# Patient Record
Sex: MALE
[De-identification: names, ages, dates, MRNs, and addresses within clinical notes are randomized per-mention and may not be internally consistent; named-entity substitution may affect disease eponyms.]

---

## 2024-02-12 PROBLEM — Z00.00 ENCOUNTER FOR PREVENTIVE HEALTH EXAMINATION: Status: ACTIVE | Noted: 2024-02-12

## 2024-02-21 ENCOUNTER — APPOINTMENT (OUTPATIENT)
Dept: NEUROLOGY | Facility: CLINIC | Age: 39
End: 2024-02-21
Payer: COMMERCIAL

## 2024-02-21 VITALS
WEIGHT: 112.44 LBS | SYSTOLIC BLOOD PRESSURE: 145 MMHG | HEIGHT: 63 IN | BODY MASS INDEX: 19.92 KG/M2 | DIASTOLIC BLOOD PRESSURE: 88 MMHG | HEART RATE: 76 BPM

## 2024-02-21 PROCEDURE — 99204 OFFICE O/P NEW MOD 45 MIN: CPT

## 2024-02-29 ENCOUNTER — APPOINTMENT (OUTPATIENT)
Dept: NEUROLOGY | Facility: CLINIC | Age: 39
End: 2024-02-29
Payer: COMMERCIAL

## 2024-02-29 PROCEDURE — 95886 MUSC TEST DONE W/N TEST COMP: CPT

## 2024-02-29 PROCEDURE — 95912 NRV CNDJ TEST 11-12 STUDIES: CPT

## 2024-02-29 NOTE — PHYSICAL EXAM

## 2024-02-29 NOTE — ASSESSMENT
[FreeTextEntry1] : Thoracic / Lumbar Radiculopathy.   - MRI f the Thoracic/lumbar spine without Richardson.  - EMG of the lower extremities.  - He will follow up with pain management. -Trial gabapentin, patient warned of the potential side effect of medication and he reported understanding   I, Scarlet Roper, Attest that this documentation has been prepared under the direction and in the presence of Provider Wade Holguin DO  Thank You for letting me assist in the management of this patient.   Wade Holguin DO Board Certified, Neurology

## 2024-02-29 NOTE — HISTORY OF PRESENT ILLNESS
[FreeTextEntry1] : It is a pleasure to see MRS. GREGORY In the office today. He is A 38-Year-old Man who presents to the office today for the evaluation of Mid back pain that progressed for 3 months. This came about on its own. The pain shoots across the mid back. After a trial of physical therapy for the back, there was no sufficient relief. He is open to follow up with our pain management doctors at this time. Additionally, he adds of having low back pain for six months that radiates to the left lower extremities. He trialed a round of physical therapy with no relief. There is pain that started on the left shoulder blade as well causing joint pain. 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 He denies pain in any other joints, muscle ligaments.

## 2024-03-09 ENCOUNTER — APPOINTMENT (OUTPATIENT)
Dept: MRI IMAGING | Facility: CLINIC | Age: 39
End: 2024-03-09
Payer: COMMERCIAL

## 2024-03-09 PROCEDURE — 72146 MRI CHEST SPINE W/O DYE: CPT

## 2024-03-09 PROCEDURE — 72148 MRI LUMBAR SPINE W/O DYE: CPT

## 2024-03-14 ENCOUNTER — APPOINTMENT (OUTPATIENT)
Dept: PAIN MANAGEMENT | Facility: CLINIC | Age: 39
End: 2024-03-14

## 2024-04-05 ENCOUNTER — APPOINTMENT (OUTPATIENT)
Dept: PAIN MANAGEMENT | Facility: CLINIC | Age: 39
End: 2024-04-05
Payer: COMMERCIAL

## 2024-04-05 PROCEDURE — 99204 OFFICE O/P NEW MOD 45 MIN: CPT

## 2024-04-05 NOTE — HISTORY OF PRESENT ILLNESS
[FreeTextEntry1] : This is a 38-year-old male here to establish care for a lower back pain with radicular features into the left lower extremity. He complains of a numbing sensation in the lower back. The pain came about insidiously about 8 months ago. He denies any trauma or injury to the area. He is referred to me by Dr. Holguin for further evaluation. He has trialed one session of physical therapy which provided no relief of his symptoms. MRI of the lumbar spine shows evidence of bulging discs at the L5-S1 and L4-5 levels. The option to trial injection therapy was discussed and he is agreeable.

## 2024-04-05 NOTE — ASSESSMENT
[FreeTextEntry1] : This is a 38-year-old male with complaints of lower back pain with radicular features into the left lower extremity.  He also complains of a numbing sensation in her lower back. The pain has not responded to conservative care, including medications, stretching, as well as active modalities, such as physical therapy.  Imaging studies as well as physical exam findings corroborate the symptomatology. We will proceed with a L5-S1 interlaminar ROSLYN x1 w/ mac and he will follow up afterwards. All this patients questions were answered and the conversation was understood well.  Patient had a MRI that shows a radicular component along with pain referred into the lower extremity. Patient has trialed rehab (Home exercise, physical therapy or chiropractic care) and medications I will schedule an L5-S1 interlaminar epidural steroid injection.  The patient has severe anxiety of procedures that necessitates monitored anesthesia care (MAC). The procedure performed will be close to major nerves, arteries, and spinal cord and/or joint structures. Due to the proximity of these structures, we need the patient to be still during the procedure. With the help of MAC, this will be safely achieved and decrease the risk of any complications.  Risk, benefits, pros and cons of procedure were explained to the patient using models and diagrams and their questions were answered.  I, Joann Khan, attest that this documentation has been prepared under the direction and in the presence of Provider Trinity Acosta MD.   Thank you for allowing me to assist in the management of this patient.    Best Regards,    Trinity Acosta M.D., FAAPMR   Diplomate, American Board of Physical Medicine and Rehabilitation Diplomate, American Board of Pain Medicine

## 2024-04-05 NOTE — PHYSICAL EXAM
[Normal Coordination] : normal coordination [Normal DTR UE/LE] : normal DTR UE/LE  [Normal Sensation] : normal sensation [Normal Mood and Affect] : normal mood and affect [Oriented] : oriented [Able to Communicate] : able to communicate [Well Developed] : well developed [Well Nourished] : well nourished [Extension] : extension [] : no swelling

## 2024-04-10 ENCOUNTER — APPOINTMENT (OUTPATIENT)
Dept: NEUROLOGY | Facility: CLINIC | Age: 39
End: 2024-04-10
Payer: COMMERCIAL

## 2024-04-10 PROCEDURE — 99214 OFFICE O/P EST MOD 30 MIN: CPT

## 2024-04-10 NOTE — PHYSICAL EXAM
[Person] : oriented to person [Place] : oriented to place [Time] : oriented to time [Concentration Intact] : normal concentrating ability [Visual Intact] : visual attention was ~T not ~L decreased [Naming Objects] : no difficulty naming common objects [Repeating Phrases] : no difficulty repeating a phrase [Writing A Sentence] : no difficulty writing a sentence [Fluency] : fluency intact [Comprehension] : comprehension intact [Reading] : reading intact [Past History] : adequate knowledge of personal past history [Cranial Nerves Optic (II)] : visual acuity intact bilaterally,  visual fields full to confrontation, pupils equal round and reactive to light [Cranial Nerves Oculomotor (III)] : extraocular motion intact [Cranial Nerves Trigeminal (V)] : facial sensation intact symmetrically [Cranial Nerves Facial (VII)] : face symmetrical [Cranial Nerves Vestibulocochlear (VIII)] : hearing was intact bilaterally [Cranial Nerves Glossopharyngeal (IX)] : tongue and palate midline [Cranial Nerves Accessory (XI - Cranial And Spinal)] : head turning and shoulder shrug symmetric [Cranial Nerves Hypoglossal (XII)] : there was no tongue deviation with protrusion [Motor Tone] : muscle tone was normal in all four extremities [Motor Strength] : muscle strength was normal in all four extremities [No Muscle Atrophy] : normal bulk in all four extremities [Sensation Tactile Decrease] : light touch was intact [Abnormal Walk] : normal gait [Balance] : balance was intact [2+] : Ankle jerk left 2+ [General Appearance - Alert] : alert [General Appearance - In No Acute Distress] : in no acute distress [Oriented To Time, Place, And Person] : oriented to person, place, and time [Impaired Insight] : insight and judgment were intact [Affect] : the affect was normal [Past-pointing] : there was no past-pointing [Tremor] : no tremor present [Plantar Reflex Right Only] : normal on the right [Plantar Reflex Left Only] : normal on the left [FreeTextEntry7] : Tenderness to light percussion in the mid back [FreeTextEntry1] : tenderness to palpation of lumbar paraspinals  [Involuntary Movements] : no involuntary movements were seen

## 2024-04-10 NOTE — HISTORY OF PRESENT ILLNESS
[FreeTextEntry1] : Original Presentation : It is a pleasure to see MRS. GREGORY In the office today. He is A 38-Year-old Man who presents to the office today for the evaluation of Mid back pain that progressed for 3 months. This came about on its own. The pain shoots across the mid back. After a trial of physical therapy for the back, there was no sufficient relief. He is open to follow up with our pain management doctors at this time. Additionally, he adds of having low back pain for six months that radiates to the left lower extremities. He trialed a round of physical therapy with no relief. There is pain that started on the left shoulder blade as well causing joint pain. 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 He denies pain in any other joints, muscle ligaments.   Diagnostic Testing :   MRI of the thoracic spine dated 3/09/24 is normal.  MRI of the lumbar spine dated 3/09/24 demonstrates small annular budges L5-S1 and L4-5.  EMG of the lower extremities dated 2/29/24 is normal.   Today : Today I had the pleasure of seeing  Mr. GREGORY in follow up. The patient's prior history and imaging have been reviewed.   Patient remains under our care for chronic lumbar radiculopathy. This is a chronic condition for which she is receiving active and continuous treatment. Today we reviewed the results of her MRI of the Thoracic and Lumbar Spine  as well as her EMG / NCV testing of the lower extremities. Patient saw pain management Dr. Acosta on 4.5.24 who also reviewed her diagnostic testing and offered an L5-S1  interlaminar epidural steroid injection on 4.16.24. Patient trialed gabapentin 300mg TID but states it did not provide any relief. Today he notes numbness and tingling to his left glute and posterior thigh and well as "tightness" along his lumbar paraspinals. He denies worsening or progressive symptoms as well as any saddle anesthesia or urinary / bowel incontience or retention.

## 2024-04-10 NOTE — ASSESSMENT
[FreeTextEntry1] : 38 year old male with low back pain and left sided Lumbar Radiculopathy. Patient will begin cyclobenzaprine 10mg BID PRN while he awaits his injection with pain  management on 4.16.24 and will follow up with neurology as needed.  Patient is aware that they may call/ contact the office at any time if they have any additional questions or concerns regarding their management. All potential risks, benefits, side effects and interactions of any medications prescribed where discussed in detail with patient at the time of todays encounter.*  Supervising Physician : Wade Holguin DO

## 2024-04-23 ENCOUNTER — APPOINTMENT (OUTPATIENT)
Dept: PAIN MANAGEMENT | Facility: CLINIC | Age: 39
End: 2024-04-23

## 2024-04-23 ENCOUNTER — APPOINTMENT (OUTPATIENT)
Dept: PAIN MANAGEMENT | Facility: CLINIC | Age: 39
End: 2024-04-23
Payer: COMMERCIAL

## 2024-04-23 PROCEDURE — 94761 N-INVAS EAR/PLS OXIMETRY MLT: CPT

## 2024-04-23 PROCEDURE — 62323 NJX INTERLAMINAR LMBR/SAC: CPT

## 2024-04-23 PROCEDURE — 93770 DETERMINATION VENOUS PRESS: CPT | Mod: 59

## 2024-04-23 PROCEDURE — 93040 RHYTHM ECG WITH REPORT: CPT | Mod: 79

## 2024-04-23 NOTE — PROCEDURE
[FreeTextEntry3] :  Interlaminar Lumbar Epidural Steroid Injection     Date:  2024  Patient: BRI GREGORY   :  1985   Preoperative Diagnosis: Lumbar Radiculopathy  Postoperative Diagnosis: Lumbar Radiculopathy   Procedure:  1. Interlaminar L5-S1 Lumbar Epidural Injection under fluoroscopy 2. Epidurography 3. Fluoroscopic guidance and localization of needle    Physician: Trinity Acosta M.D.   Anesthesia: Local   Medical Necessity:  Failure of conservative management.   Consent:  Though unusual, the possible complications including infection, bleeding, nerve damage, hospital admission, stroke, pneumothorax, death or failure of the procedure are theoretically possible. The patient was educated about the of the procedure and alternative therapies. All questions were answered and the patient freely gave consent to proceed.  Indication for Fluoroscopy:  This procedure requires the precise placement of the spinal needle into the epidural space.  It is the only way to accurately and safely perform the injection.  PROCEDURE NOTE:  After obtaining written consent, in the sitting position an IV was placed in the upper extremity by the CRNA. The patient was placed in the prone position. The lumbosacral region was prepped with betadine and draped in usual sterile fashion. A time out was performed.  The L5-S1 interspace was identified using fluoroscopy. The skin was infiltrated with lidocaine 2% -- 2mL for subcutaneous analgesia. The epidural space was identified using a 18g touhy needle with a midline approach using a loss of resistance technique. 2mL omnipaque was used to define the space. A solution of 6ml of preservative-free sterile saline and 1ml of depomedrol 80mg was infused in 1mL increments slowly with minimal pressure on the syringe into the epidural space. The procedure was tolerated well. There was no evidence of CSF, paresthesias, or heme.     Epidurogram: Distal and proximal spread was noted.   Complications: none.    Disposition: I have examined the patient and there are no new physical findings since original presentation. The patient was discharged home with a . The discharge instruction sheet was given to the patient. Motor function was intact.   Comment: 1st Interlaminar ROSLYN today, depending on effectiveness would schedule 2nd Interlaminar in 1-2 weeks vs caudal epidural steroid vs follow up in office. Call if any problems  This document was signed by:   Trinity Acosta MD, FAAPMR Diplomate, American Board of Physical Medicine and Rehabilitation Diplomate, American Board of Pain Medicine

## 2024-05-07 ENCOUNTER — APPOINTMENT (OUTPATIENT)
Dept: PAIN MANAGEMENT | Facility: CLINIC | Age: 39
End: 2024-05-07
Payer: COMMERCIAL

## 2024-05-07 PROCEDURE — 99214 OFFICE O/P EST MOD 30 MIN: CPT

## 2024-05-07 NOTE — DATA REVIEWED
[FreeTextEntry1] : MRI of the thoracic spine dated 3/09/24 is normal.  MRI of the lumbar spine dated 3/09/24 demonstrates small annular budges L5-S1 and L4-5.  EMG of the lower extremities dated 2/29/24 is normal.

## 2024-05-07 NOTE — HISTORY OF PRESENT ILLNESS
[FreeTextEntry1] : This is a 38-year-old male here to establish care for a lower back pain with radicular features into the left lower extremity. He complains of a numbing sensation in the lower back. The pain came about insidiously about 8 months ago. He denies any trauma or injury to the area. He is referred to me by Dr. Holguin for further evaluation. He has trialed one session of physical therapy which provided no relief of his symptoms. MRI of the lumbar spine shows evidence of bulging discs at the L5-S1 and L4-5 levels. The option to trial injection therapy was discussed and he is agreeable.    TODAY: Last seen on 04/05/2024 and since then he underwent L5-S1 Interlaminar injection on 04/23/2024 and today reports at least 50% of pain relief and improvement of functioning. Some pain and numbness still remains. Patient rates his current pain level as 6/10. We will request second ROSLYN today in hope to build on the success of previous injection.

## 2024-05-07 NOTE — DISCUSSION/SUMMARY
[de-identified] : This is a 38-year-old male with complaints of lower back pain with radicular features into the left lower extremity.  He also complains of a numbing sensation in her lower back. The pain has not responded to conservative care, including medications, stretching, as well as active modalities, such as physical therapy.  Imaging studies as well as physical exam findings corroborate the symptomatology. He underwent L5-S1 Interlaminar injection on 04/23/2024 and today reports at least 50% of pain relief and improvement of functioning. Some pain and numbness still remains. We will proceed with a second L5-S1 interlaminar ROSLYN w/ MAC and he will follow up afterwards. All this patients questions were answered and the conversation was understood well.  Patient had a MRI that shows a radicular component along with pain referred into the lower extremity. Patient has trialed rehab (Home exercise, physical therapy or chiropractic care) and medications I will schedule an L5-S1 interlaminar epidural steroid injection.  The patient has severe anxiety of procedures that necessitates monitored anesthesia care (MAC). The procedure performed will be close to major nerves, arteries, and spinal cord and/or joint structures. Due to the proximity of these structures, we need the patient to be still during the procedure. With the help of MAC, this will be safely achieved and decrease the risk of any complications.  Risk, benefits, pros and cons of procedure were explained to the patient using models and diagrams and their questions were answered.  Total clinician time spent today on the patient is 30 minutes including preparing to see the patient, obtaining and/or reviewing and confirming history, performing medically necessary and appropriate examination, counseling and educating the patient and/or family, documenting clinical information in the EHR and communicating and/or referring to other healthcare professionals.  ZAHRA Watson M.D., FAAPMR

## 2024-05-07 NOTE — PHYSICAL EXAM
[Normal Coordination] : normal coordination [Normal DTR UE/LE] : normal DTR UE/LE  [Normal Sensation] : normal sensation [Normal Mood and Affect] : normal mood and affect [Oriented] : oriented [Able to Communicate] : able to communicate [Well Developed] : well developed [Well Nourished] : well nourished [Extension] : extension [] : patient ambulates without assistive device

## 2024-05-17 ENCOUNTER — APPOINTMENT (OUTPATIENT)
Dept: PAIN MANAGEMENT | Facility: CLINIC | Age: 39
End: 2024-05-17

## 2024-05-17 ENCOUNTER — APPOINTMENT (OUTPATIENT)
Dept: PAIN MANAGEMENT | Facility: CLINIC | Age: 39
End: 2024-05-17
Payer: COMMERCIAL

## 2024-05-17 PROCEDURE — 94761 N-INVAS EAR/PLS OXIMETRY MLT: CPT | Mod: 59

## 2024-05-17 PROCEDURE — 62323 NJX INTERLAMINAR LMBR/SAC: CPT

## 2024-05-17 PROCEDURE — 93770 DETERMINATION VENOUS PRESS: CPT | Mod: 59

## 2024-05-17 PROCEDURE — 93040 RHYTHM ECG WITH REPORT: CPT | Mod: 79

## 2024-05-17 NOTE — PROCEDURE
[FreeTextEntry3] :  Interlaminar Lumbar Epidural Steroid Injection     Date:  2024  Patient: BRI GREGORY   :  1985   Preoperative Diagnosis: Lumbar Radiculopathy  Postoperative Diagnosis: Lumbar Radiculopathy   Procedure:  1. Interlaminar L5-S1 Lumbar Epidural Injection under fluoroscopy 2. Epidurography 3. Fluoroscopic guidance and localization of needle    Physician: Trinity Acosta M.D.   Anesthesia: Local   Medical Necessity:  Failure of conservative management.   Consent:  Though unusual, the possible complications including infection, bleeding, nerve damage, hospital admission, stroke, pneumothorax, death or failure of the procedure are theoretically possible. The patient was educated about the of the procedure and alternative therapies. All questions were answered and the patient freely gave consent to proceed.  Indication for Fluoroscopy:  This procedure requires the precise placement of the spinal needle into the epidural space.  It is the only way to accurately and safely perform the injection.  PROCEDURE NOTE:  After obtaining written consent, in the sitting position an IV was placed in the upper extremity by the CRNA. The patient was placed in the prone position. The lumbosacral region was prepped with betadine and draped in usual sterile fashion. A time out was performed.  The L5-S1 interspace was identified using fluoroscopy. The skin was infiltrated with lidocaine 2% -- 2mL for subcutaneous analgesia. The epidural space was identified using a 18g touhy needle with a midline approach using a loss of resistance technique. 2mL omnipaque was used to define the space. A solution of 6ml of preservative-free sterile saline and 1ml of depomedrol 80mg was infused in 1mL increments slowly with minimal pressure on the syringe into the epidural space. The procedure was tolerated well. There was no evidence of CSF, paresthesias, or heme.     Epidurogram: Distal and proximal spread was noted.   Complications: none.    Disposition: I have examined the patient and there are no new physical findings since original presentation. The patient was discharged home with a . The discharge instruction sheet was given to the patient. Motor function was intact.   Comment:  2nd Interlaminar ROSLYN today, depending on effectiveness would schedule 2nd Interlaminar in 1-2 weeks vs caudal epidural steroid vs follow up in office. Call if any problems  This document was signed by:   Trinity Acosta MD, FAAPMR Diplomate, American Board of Physical Medicine and Rehabilitation Diplomate, American Board of Pain Medicine

## 2024-06-04 ENCOUNTER — APPOINTMENT (OUTPATIENT)
Dept: PAIN MANAGEMENT | Facility: CLINIC | Age: 39
End: 2024-06-04
Payer: COMMERCIAL

## 2024-06-04 DIAGNOSIS — M54.50 LOW BACK PAIN, UNSPECIFIED: ICD-10-CM

## 2024-06-04 DIAGNOSIS — M54.6 PAIN IN THORACIC SPINE: ICD-10-CM

## 2024-06-04 PROCEDURE — 99214 OFFICE O/P EST MOD 30 MIN: CPT

## 2024-06-04 RX ORDER — CYCLOBENZAPRINE HYDROCHLORIDE 10 MG/1
10 TABLET, FILM COATED ORAL 3 TIMES DAILY
Qty: 90 | Refills: 1 | Status: ACTIVE | COMMUNITY
Start: 2024-04-10 | End: 1900-01-01

## 2024-06-04 RX ORDER — GABAPENTIN 300 MG/1
300 CAPSULE ORAL 3 TIMES DAILY
Qty: 90 | Refills: 1 | Status: ACTIVE | COMMUNITY
Start: 2024-02-21 | End: 1900-01-01

## 2024-06-04 NOTE — DISCUSSION/SUMMARY
[Medication Risks Reviewed] : Medication risks reviewed [de-identified] : This is a 38-year-old male with complaints of lower back pain with radicular features into the left lower extremity.  He also complains of a numbing sensation in her lower back. The pain has not responded to conservative care, including medications, stretching, as well as active modalities, such as physical therapy.  Imaging studies as well as physical exam findings corroborate the symptomatology. He underwent second  L5-S1 Interlaminar injection on 05/17/2024 and today reports at least 50% of pain relief and improvement of functioning. Some pain and numbness still remains. We will proceed with a third L5-S1 interlaminar ROSLYN w/ MAC and he will follow up afterwards.  We will continue him on Gabapentin and Cyclobenzaprine. All this patients questions were answered and the conversation was understood well.  Patient had a MRI that shows a radicular component along with pain referred into the lower extremity. Patient has trialed rehab (Home exercise, physical therapy or chiropractic care) and medications I will schedule an L5-S1 interlaminar epidural steroid injection.  The patient has severe anxiety of procedures that necessitates monitored anesthesia care (MAC). The procedure performed will be close to major nerves, arteries, and spinal cord and/or joint structures. Due to the proximity of these structures, we need the patient to be still during the procedure. With the help of MAC, this will be safely achieved and decrease the risk of any complications.  Risk, benefits, pros and cons of procedure were explained to the patient using models and diagrams and their questions were answered.  Total clinician time spent today on the patient is 30 minutes including preparing to see the patient, obtaining and/or reviewing and confirming history, performing medically necessary and appropriate examination, counseling and educating the patient and/or family, documenting clinical information in the EHR and communicating and/or referring to other healthcare professionals.  ZAHRA Watson M.D., FAAPMR

## 2024-06-04 NOTE — HISTORY OF PRESENT ILLNESS
[FreeTextEntry1] : This is a 38-year-old male here to establish care for a lower back pain with radicular features into the left lower extremity. He complains of a numbing sensation in the lower back. The pain came about insidiously about 8 months ago. He denies any trauma or injury to the area. He is referred to me by Dr. Holguin for further evaluation. He has trialed one session of physical therapy which provided no relief of his symptoms. MRI of the lumbar spine shows evidence of bulging discs at the L5-S1 and L4-5 levels. The option to trial injection therapy was discussed and he is agreeable.    TODAY: Last seen on 05/07/2024 and since then he underwent second  L5-S1 Interlaminar injection on 05/17/2024 and today reports at least 60% of pain relief and improvement of functioning. Some pain and numbness still remains. Patient rates his current pain level as 6/10. We will request third ROSLYN today in hope to build on the success of previous injection.  He also manages his pain with Gabapentin and Cyclobenzaprine with at least 50% pain relief and will continue with no changes.

## 2024-06-21 ENCOUNTER — APPOINTMENT (OUTPATIENT)
Dept: PAIN MANAGEMENT | Facility: CLINIC | Age: 39
End: 2024-06-21
Payer: COMMERCIAL

## 2024-06-21 ENCOUNTER — APPOINTMENT (OUTPATIENT)
Dept: PAIN MANAGEMENT | Facility: CLINIC | Age: 39
End: 2024-06-21

## 2024-06-21 DIAGNOSIS — M54.16 RADICULOPATHY, LUMBAR REGION: ICD-10-CM

## 2024-06-21 PROCEDURE — 93770 DETERMINATION VENOUS PRESS: CPT

## 2024-06-21 PROCEDURE — 93040 RHYTHM ECG WITH REPORT: CPT | Mod: 79

## 2024-06-21 PROCEDURE — 62323 NJX INTERLAMINAR LMBR/SAC: CPT

## 2024-06-21 PROCEDURE — 94761 N-INVAS EAR/PLS OXIMETRY MLT: CPT

## 2024-06-21 NOTE — PROCEDURE
[FreeTextEntry3] : Interlaminar Lumbar Epidural Steroid Injection     Date:  2024  Patient: BRI GREGORY   :  1985   Preoperative Diagnosis: Lumbar Radiculopathy  Postoperative Diagnosis: Lumbar Radiculopathy   Procedure:  1. Interlaminar L5-S1 Lumbar Epidural Injection under fluoroscopy 2. Epidurography 3. Fluoroscopic guidance and localization of needle   Physician: Trinity Acosta M.D.  Anesthesia: Local  Medical Necessity:  Failure of conservative management.   Consent:  Though unusual, the possible complications including infection, bleeding, nerve damage, hospital admission, stroke, pneumothorax, death or failure of the procedure are theoretically possible. The patient was educated about the of the procedure and alternative therapies. All questions were answered and the patient freely gave consent to proceed.  Indication for Fluoroscopy:  This procedure requires the precise placement of the spinal needle into the epidural space.  It is the only way to accurately and safely perform the injection.  PROCEDURE NOTE:  After obtaining written consent, in the sitting position an IV was placed in the upper extremity by the CRNA. The patient was placed in the prone position. The lumbosacral region was prepped with betadine and draped in usual sterile fashion. A time out was performed.  The L5-S1 interspace was identified using fluoroscopy. The skin was infiltrated with lidocaine 2% -- 2mL for subcutaneous analgesia. The epidural space was identified using a 18g touhy needle with a midline approach using a loss of resistance technique. 2mL omnipaque was used to define the space. A solution of 6ml of preservative-free sterile saline and 1ml of depomedrol 80mg was infused in 1mL increments slowly with minimal pressure on the syringe into the epidural space. The procedure was tolerated well. There was no evidence of CSF, paresthesias, or heme.     Epidurogram: Distal and proximal spread was noted.   Complications: none.    Disposition: I have examined the patient and there are no new physical findings since original presentation. The patient was discharged home with a . The discharge instruction sheet was given to the patient. Motor function was intact.   Comment:  3rd Interlaminar ROSLYN today, follow up in office. Call if any problems  This document was signed by:   Trinity Acosta MD, FAAPMR Diplomate, American Board of Physical Medicine and Rehabilitation Diplomate, American Board of Pain Medicine

## 2024-06-28 ENCOUNTER — APPOINTMENT (OUTPATIENT)
Dept: PAIN MANAGEMENT | Facility: CLINIC | Age: 39
End: 2024-06-28

## 2024-07-15 ENCOUNTER — APPOINTMENT (OUTPATIENT)
Dept: PAIN MANAGEMENT | Facility: CLINIC | Age: 39
End: 2024-07-15
Payer: COMMERCIAL

## 2024-07-15 DIAGNOSIS — M54.50 LOW BACK PAIN, UNSPECIFIED: ICD-10-CM

## 2024-07-15 DIAGNOSIS — M54.16 RADICULOPATHY, LUMBAR REGION: ICD-10-CM

## 2024-07-15 DIAGNOSIS — M54.6 PAIN IN THORACIC SPINE: ICD-10-CM

## 2024-07-15 PROCEDURE — 99214 OFFICE O/P EST MOD 30 MIN: CPT

## 2024-09-20 ENCOUNTER — APPOINTMENT (OUTPATIENT)
Dept: PAIN MANAGEMENT | Facility: CLINIC | Age: 39
End: 2024-09-20
Payer: COMMERCIAL

## 2024-09-20 DIAGNOSIS — M54.50 LOW BACK PAIN, UNSPECIFIED: ICD-10-CM

## 2024-09-20 DIAGNOSIS — M54.16 RADICULOPATHY, LUMBAR REGION: ICD-10-CM

## 2024-09-20 DIAGNOSIS — M25.9 JOINT DISORDER, UNSPECIFIED: ICD-10-CM

## 2024-09-20 PROCEDURE — 99214 OFFICE O/P EST MOD 30 MIN: CPT

## 2024-09-20 NOTE — HISTORY OF PRESENT ILLNESS
[FreeTextEntry1] : HPI: This is a 38-year-old male here to establish care for a lower back pain with radicular features into the left lower extremity. He complains of a numbing sensation in the lower back. The pain came about insidiously about 8 months ago. He denies any trauma or injury to the area. He is referred to me by Dr. Holguin for further evaluation. He has trialed one session of physical therapy which provided no relief of his symptoms. MRI of the lumbar spine shows evidence of bulging discs at the L5-S1 and L4-5 levels. The option to trial injection therapy was discussed and he is agreeable.   TODAY: Patient presents for a revisit appointment. Patient underwent three L5-S1 Interlaminar injections. His last one was completed on 06/21/2024. He states his lumbar pain and numbness has subsided. Patient states that despite the injections, he continues to have left buttock pain with pain traveling down his posterior thigh to the knee region. He denies any recent falls. Pain is worse when he is standing. He has trialed PT. He is currently on Gabapentin and Cyclobenzaprine.

## 2024-10-14 ENCOUNTER — APPOINTMENT (OUTPATIENT)
Dept: NEUROLOGY | Facility: CLINIC | Age: 39
End: 2024-10-14

## 2024-10-15 ENCOUNTER — APPOINTMENT (OUTPATIENT)
Facility: CLINIC | Age: 39
End: 2024-10-15

## 2024-10-15 ENCOUNTER — APPOINTMENT (OUTPATIENT)
Dept: PAIN MANAGEMENT | Facility: CLINIC | Age: 39
End: 2024-10-15
Payer: COMMERCIAL

## 2024-10-15 DIAGNOSIS — M53.3 SACROCOCCYGEAL DISORDERS, NOT ELSEWHERE CLASSIFIED: ICD-10-CM

## 2024-10-15 PROCEDURE — 27096 INJECT SACROILIAC JOINT: CPT | Mod: LT

## 2024-10-15 PROCEDURE — 93770 DETERMINATION VENOUS PRESS: CPT

## 2024-10-15 PROCEDURE — 93040 RHYTHM ECG WITH REPORT: CPT | Mod: 79,59

## 2024-10-15 PROCEDURE — 94761 N-INVAS EAR/PLS OXIMETRY MLT: CPT | Mod: 59

## 2024-10-15 PROCEDURE — 01992 ANES DX/THER NRV BLK&INJ PRN: CPT | Mod: QZ,P1

## 2024-10-22 ENCOUNTER — APPOINTMENT (OUTPATIENT)
Dept: PAIN MANAGEMENT | Facility: CLINIC | Age: 39
End: 2024-10-22

## 2024-10-29 ENCOUNTER — APPOINTMENT (OUTPATIENT)
Dept: PAIN MANAGEMENT | Facility: CLINIC | Age: 39
End: 2024-10-29
Payer: COMMERCIAL

## 2024-10-29 DIAGNOSIS — M54.16 RADICULOPATHY, LUMBAR REGION: ICD-10-CM

## 2024-10-29 DIAGNOSIS — M25.9 JOINT DISORDER, UNSPECIFIED: ICD-10-CM

## 2024-10-29 DIAGNOSIS — M54.50 LOW BACK PAIN, UNSPECIFIED: ICD-10-CM

## 2024-10-29 PROCEDURE — 99214 OFFICE O/P EST MOD 30 MIN: CPT

## 2024-11-15 ENCOUNTER — APPOINTMENT (OUTPATIENT)
Facility: CLINIC | Age: 39
End: 2024-11-15
Payer: COMMERCIAL

## 2024-11-15 ENCOUNTER — APPOINTMENT (OUTPATIENT)
Dept: PAIN MANAGEMENT | Facility: CLINIC | Age: 39
End: 2024-11-15
Payer: COMMERCIAL

## 2024-11-15 DIAGNOSIS — M53.3 SACROCOCCYGEAL DISORDERS, NOT ELSEWHERE CLASSIFIED: ICD-10-CM

## 2024-11-15 PROCEDURE — 94761 N-INVAS EAR/PLS OXIMETRY MLT: CPT

## 2024-11-15 PROCEDURE — 27096 INJECT SACROILIAC JOINT: CPT | Mod: LT

## 2024-11-15 PROCEDURE — 93040 RHYTHM ECG WITH REPORT: CPT | Mod: 79

## 2024-11-15 PROCEDURE — 01992 ANES DX/THER NRV BLK&INJ PRN: CPT | Mod: QZ,P2

## 2024-11-15 PROCEDURE — 93770 DETERMINATION VENOUS PRESS: CPT

## 2024-11-26 ENCOUNTER — APPOINTMENT (OUTPATIENT)
Dept: PAIN MANAGEMENT | Facility: CLINIC | Age: 39
End: 2024-11-26
Payer: COMMERCIAL

## 2024-11-26 VITALS — WEIGHT: 112 LBS | BODY MASS INDEX: 19.84 KG/M2 | HEIGHT: 63 IN

## 2024-11-26 DIAGNOSIS — M53.3 SACROCOCCYGEAL DISORDERS, NOT ELSEWHERE CLASSIFIED: ICD-10-CM

## 2024-11-26 DIAGNOSIS — M54.16 RADICULOPATHY, LUMBAR REGION: ICD-10-CM

## 2024-11-26 PROCEDURE — 99213 OFFICE O/P EST LOW 20 MIN: CPT

## 2025-01-28 NOTE — DATA REVIEWED
Follow all instructions given by your physician  If Urology case Call [phone] the weekday before procedure to find out Arrival Time.  Do not eat or drink anything after midnight prior to surgery(includes water, chewing gum, mints and ice chips)  Sips of water am of surgery with allowed medications  May brush teeth   Do not smoke or chew tobacco, drink alcoholic beverages or use any illicit drugs for 24 hours prior to surgery  Bring insurance info and photo ID  Bring pertinent paperwork with you from Doctor or surgeons's office  Wear clean comfortable, loose-fitting clothing  No make-up, nail polish, jewelry, piercings, or contact lenses to be worn day of surgery  No glue on dentures morning of surgery; you will be asked to remove them for surgery. Case for glasses.  Shower the night before and the morning of surgery with cleansing soap provided or a liquid antibacterial soap, dry with new fresh clean towel after each shower, no lotions, creams or powder.  Clean sheets and pillowcase on bed night before surgery  Bring medications in original bottles, Bring rescue inhalers with you  Bring CPAP/BIPAP machine if you have one ( you may be charged if one is needed in recovery room ) no  pacemaker no     Do you have a DNR? no  Please Bring Healthcare Directive or Healthcare Power of  in so we can scan it into your chart.    Our pharmacy has a Meds to Beds program where they will deliver any new prescriptions you may have to your room before you leave. Our Pharmacy will clear it through your insurance; for example (same co pay). This enables you to take your new RX as soon as you need when you get home and avoids stop/wait delays on the way home.  Please have a form of payment with you and have someone designated as your Pharmacy contact with their phone # as you may not feel well or still be under the influence of anesthesia.    Please refer to the SSI-Surgical Site Infection Flyer you hopefully received in 
[FreeTextEntry1] : MRI of the thoracic spine dated 3/09/24 is normal.  MRI of the lumbar spine dated 3/09/24 demonstrates small annular budges L5-S1 and L4-5.  EMG of the lower extremities dated 2/29/24 is normal.